# Patient Record
Sex: FEMALE | Race: WHITE | NOT HISPANIC OR LATINO | URBAN - METROPOLITAN AREA
[De-identification: names, ages, dates, MRNs, and addresses within clinical notes are randomized per-mention and may not be internally consistent; named-entity substitution may affect disease eponyms.]

---

## 2019-01-24 ENCOUNTER — NEW PATIENT (OUTPATIENT)
Dept: URBAN - METROPOLITAN AREA CLINIC 44 | Facility: CLINIC | Age: 69
End: 2019-01-24
Payer: MEDICARE

## 2019-01-24 DIAGNOSIS — H04.123 OPEN ANGLE WITH BORDERLINE FINDINGS, LOW RISK, BILATERAL: ICD-10-CM

## 2019-01-24 DIAGNOSIS — H00.14 CHALAZION LEFT UPPER LID: ICD-10-CM

## 2019-01-24 PROCEDURE — 92133 CPTRZD OPH DX IMG PST SGM ON: CPT | Performed by: OPTOMETRIST

## 2019-01-24 PROCEDURE — 76514 ECHO EXAM OF EYE THICKNESS: CPT | Performed by: OPTOMETRIST

## 2019-01-24 PROCEDURE — 92004 COMPRE OPH EXAM NEW PT 1/>: CPT | Performed by: OPTOMETRIST

## 2019-01-24 ASSESSMENT — INTRAOCULAR PRESSURE
OD: 21
OS: 21

## 2019-01-24 ASSESSMENT — KERATOMETRY
OS: 44.63
OD: 45.75

## 2019-01-24 ASSESSMENT — VISUAL ACUITY
OS: 20/20
OD: 20/20

## 2019-07-15 ENCOUNTER — FOLLOW UP ESTABLISHED (OUTPATIENT)
Dept: URBAN - METROPOLITAN AREA CLINIC 44 | Facility: CLINIC | Age: 69
End: 2019-07-15
Payer: MEDICARE

## 2019-07-15 PROCEDURE — 92083 EXTENDED VISUAL FIELD XM: CPT | Performed by: OPTOMETRIST

## 2019-07-15 PROCEDURE — 92014 COMPRE OPH EXAM EST PT 1/>: CPT | Performed by: OPTOMETRIST

## 2019-07-15 PROCEDURE — 92133 CPTRZD OPH DX IMG PST SGM ON: CPT | Performed by: OPTOMETRIST

## 2019-07-15 ASSESSMENT — INTRAOCULAR PRESSURE
OS: 21
OD: 21

## 2019-07-15 ASSESSMENT — KERATOMETRY
OS: 45.00
OD: 45.63

## 2020-01-16 ENCOUNTER — FOLLOW UP ESTABLISHED (OUTPATIENT)
Dept: URBAN - METROPOLITAN AREA CLINIC 44 | Facility: CLINIC | Age: 70
End: 2020-01-16
Payer: MEDICARE

## 2020-01-16 PROCEDURE — 92083 EXTENDED VISUAL FIELD XM: CPT | Performed by: OPTOMETRIST

## 2020-01-16 PROCEDURE — 92133 CPTRZD OPH DX IMG PST SGM ON: CPT | Performed by: OPTOMETRIST

## 2020-01-16 PROCEDURE — 92014 COMPRE OPH EXAM EST PT 1/>: CPT | Performed by: OPTOMETRIST

## 2020-01-16 ASSESSMENT — VISUAL ACUITY
OD: 20/25
OS: 20/20

## 2020-01-16 ASSESSMENT — INTRAOCULAR PRESSURE
OS: 21
OD: 21

## 2020-01-16 ASSESSMENT — KERATOMETRY
OS: 45.75
OD: 45.50

## 2021-06-01 ENCOUNTER — OFFICE VISIT (OUTPATIENT)
Dept: URBAN - METROPOLITAN AREA CLINIC 44 | Facility: CLINIC | Age: 71
End: 2021-06-01
Payer: MEDICARE

## 2021-06-01 PROCEDURE — 99214 OFFICE O/P EST MOD 30 MIN: CPT | Performed by: OPTOMETRIST

## 2021-06-01 PROCEDURE — 92083 EXTENDED VISUAL FIELD XM: CPT | Performed by: OPTOMETRIST

## 2021-06-01 PROCEDURE — 92133 CPTRZD OPH DX IMG PST SGM ON: CPT | Performed by: OPTOMETRIST

## 2021-06-01 PROCEDURE — 92134 CPTRZ OPH DX IMG PST SGM RTA: CPT | Performed by: OPTOMETRIST

## 2021-06-01 ASSESSMENT — VISUAL ACUITY
OS: 20/25
OD: 20/25

## 2021-06-01 ASSESSMENT — INTRAOCULAR PRESSURE
OS: 21
OD: 21

## 2021-06-01 ASSESSMENT — KERATOMETRY
OS: 45.13
OD: 45.38

## 2021-06-01 NOTE — IMPRESSION/PLAN
Impression: Open angle with borderline findings, low risk, bilateral: H40.013. Plan: OCT RNFL and Visual Fields wnl OU. IOP stable. Continue to monitor. 

RTC 6-8 months VF (24-2) complete exam oct RFNL

## 2021-06-01 NOTE — IMPRESSION/PLAN
Impression: Age-related nuclear cataract, bilateral: H25.13. Plan: Discussed cataracts with patient. Discussed treatment options. Surgical treatment is recommended. Surgical risks and benefits discussed. Patient elects surgical treatment. Recommend surgery OU, OS 1st . Patient is candidate/interested in multifocal, toric, standard, LenSx and ORA. Aim OD: [place] Aim OS: [place]. Patient will need glasses for full time wear.  LRI OU

## 2021-06-15 ENCOUNTER — OFFICE VISIT (OUTPATIENT)
Dept: URBAN - METROPOLITAN AREA CLINIC 44 | Facility: CLINIC | Age: 71
End: 2021-06-15
Payer: MEDICARE

## 2021-06-15 DIAGNOSIS — Z01.818 ENCOUNTER FOR OTHER PREPROCEDURAL EXAMINATION: Primary | ICD-10-CM

## 2021-06-15 DIAGNOSIS — H25.13 AGE-RELATED NUCLEAR CATARACT, BILATERAL: ICD-10-CM

## 2021-06-15 PROCEDURE — 92025 CPTRIZED CORNEAL TOPOGRAPHY: CPT | Performed by: OPHTHALMOLOGY

## 2021-06-15 PROCEDURE — 99203 OFFICE O/P NEW LOW 30 MIN: CPT | Performed by: PHYSICIAN ASSISTANT

## 2021-06-15 ASSESSMENT — PACHYMETRY
OD: 2.68
OS: 22.57
OD: 22.44
OS: 2.67

## 2021-06-21 ENCOUNTER — PRE-OPERATIVE VISIT (OUTPATIENT)
Dept: URBAN - METROPOLITAN AREA CLINIC 51 | Facility: CLINIC | Age: 71
End: 2021-06-21
Payer: MEDICARE

## 2021-06-21 DIAGNOSIS — H25.11 AGE-RELATED NUCLEAR CATARACT, RIGHT EYE: ICD-10-CM

## 2021-06-21 DIAGNOSIS — H40.013 OPEN ANGLE WITH BORDERLINE FINDINGS, LOW RISK, BILATERAL: ICD-10-CM

## 2021-06-21 DIAGNOSIS — H11.053 PERIPHERAL PTERYGIUM, STATIONARY, BILATERAL: ICD-10-CM

## 2021-06-21 DIAGNOSIS — H52.223 REGULAR ASTIGMATISM, BILATERAL: ICD-10-CM

## 2021-06-21 PROCEDURE — 92020 GONIOSCOPY: CPT | Performed by: OPHTHALMOLOGY

## 2021-06-21 PROCEDURE — 99204 OFFICE O/P NEW MOD 45 MIN: CPT | Performed by: OPHTHALMOLOGY

## 2021-06-21 NOTE — IMPRESSION/PLAN
Impression: Peripheral pterygium, stationary, bilateral: H11.053. Plan: Discussed with patient, understands this may limit vision after surgery.

## 2021-06-21 NOTE — IMPRESSION/PLAN
Impression: Age-related nuclear cataract, bilateral: H25.13. Plan: Discussed cataract diagnosis with the patient. Risks and benefits of surgical treatment were discussed and understood. Patient desires surgical treatment. Patient is a candidate for [employer], including torics discussed, but  pt declines, Pt does not mind wearing glasses after sx. Patient desires to proceed with surgery OU, OS FIRST. Both eyes examined, medically necessary due to impact in activities of daily living  (( AIM -0.25 OU: INJECTABLE OU (DEXYCU 1st choice), NO ORA OU  ,  LRI OD @ 80 ARC 80: LRI @174 ARC 80  ; glaucoma coverage, NO DIAMOX - sulfa allergy, DENSE, Cortical possible trypan; epi, poss stretch   ))  Patient understands the need for glasses after surgery. Understands higher risk of complication and delayed healing due to dense cataract. After a long discussion with pt, pt understands limitations of Toric IOL ( the 5% chance of needing a rotation of an IOL also understands NO PERFECT IOL , after long discussion , Patient declines Toric lens and understands will need glasses for both distance and near aim. Discussed level of vision and if wants to delay, but states having trouble with reading and desires surgery Discussed higher risks with smaller pupil and discussed iris stretch and higher risks of bleeding.

## 2021-06-21 NOTE — IMPRESSION/PLAN
Impression: Open angle with borderline findings, low risk, bilateral: H40.013.
 - Positive Family Hx of Glaucoma - Brother, Sister, and Mother - Has SVT , POSITIVE Sulfa Allergy -  Negative  Sleep Apnea - Hx of Migraines Plan: PLAN: Not on any glc meds - followed in general clinic ; test reviewed, IOP is stable    and so may proceed with cataract surgery with NO MIGS and Discussed glaucoma may limit vision after surgery. Discussed possible unmasking of scotoma after surgery. TESTS: Reviewed Discussed Glaucoma diagnosis in detail with patient. Emphasized and explain complaince. poor compliance can lead to Blindness.

## 2021-06-21 NOTE — IMPRESSION/PLAN
Impression: Age-related nuclear cataract, right eye: H25.11. Plan: PLAN: OK to proceed with second cataract surgery after first eye cleared.

## 2021-06-21 NOTE — IMPRESSION/PLAN
Impression: Dry eye syndrome of bilateral lacrimal glands Plan: artificial tears; Discussed with patient, understands this may limit vision after surgery.

## 2021-06-21 NOTE — IMPRESSION/PLAN
Impression: Regular astigmatism, bilateral: H52.223. Plan: Discussed with patient that LRI will attempt to reduce the astigmatism but will not get rid of all astigmatism and will need glasses after surgery. Discussed with patient, understands this may limit vision after surgery. Also discussed unmasking of astigmatism.

## 2021-07-01 ENCOUNTER — SURGERY (OUTPATIENT)
Dept: URBAN - METROPOLITAN AREA SURGERY 19 | Facility: SURGERY | Age: 71
End: 2021-07-01
Payer: MEDICARE

## 2021-07-01 PROCEDURE — 66984 XCAPSL CTRC RMVL W/O ECP: CPT | Performed by: OPHTHALMOLOGY

## 2021-07-02 ENCOUNTER — POST-OPERATIVE VISIT (OUTPATIENT)
Dept: URBAN - METROPOLITAN AREA CLINIC 44 | Facility: CLINIC | Age: 71
End: 2021-07-02

## 2021-07-02 PROCEDURE — 99024 POSTOP FOLLOW-UP VISIT: CPT | Performed by: OPTOMETRIST

## 2021-07-02 ASSESSMENT — INTRAOCULAR PRESSURE: OS: 22

## 2021-07-02 NOTE — IMPRESSION/PLAN
Impression: S/P Cataract Extraction by phacoemulsification with IOL placement; Astigmatic Keratotomy OS - 1 Day. Encounter for surgical aftercare following surgery on a sense organ  Z48.810.  Plan: brie meng iop ck next week iop same today as pre op

## 2021-07-08 ENCOUNTER — POST-OPERATIVE VISIT (OUTPATIENT)
Dept: URBAN - METROPOLITAN AREA CLINIC 44 | Facility: CLINIC | Age: 71
End: 2021-07-08
Payer: MEDICARE

## 2021-07-08 PROCEDURE — 99024 POSTOP FOLLOW-UP VISIT: CPT | Performed by: OPTOMETRIST

## 2021-07-08 ASSESSMENT — INTRAOCULAR PRESSURE
OS: 20
OD: 20

## 2021-07-08 ASSESSMENT — VISUAL ACUITY
OD: 20/25
OS: 20/25

## 2021-07-08 NOTE — IMPRESSION/PLAN
Impression: S/P Cataract Extraction by phacoemulsification with IOL placement; Astigmatic Keratotomy OS - 7 Days. Encounter for surgical aftercare following surgery on a sense organ  Z48.810.  Plan: early pch need yag futur e

## 2021-07-15 ENCOUNTER — SURGERY (OUTPATIENT)
Dept: URBAN - METROPOLITAN AREA SURGERY 19 | Facility: SURGERY | Age: 71
End: 2021-07-15
Payer: MEDICARE

## 2021-07-15 PROCEDURE — 66984 XCAPSL CTRC RMVL W/O ECP: CPT | Performed by: OPHTHALMOLOGY

## 2021-07-16 ENCOUNTER — POST-OPERATIVE VISIT (OUTPATIENT)
Dept: URBAN - METROPOLITAN AREA CLINIC 44 | Facility: CLINIC | Age: 71
End: 2021-07-16

## 2021-07-16 DIAGNOSIS — Z48.810 ENCOUNTER FOR SURGICAL AFTERCARE FOLLOWING SURGERY ON A SENSE ORGAN: ICD-10-CM

## 2021-07-16 PROCEDURE — 99024 POSTOP FOLLOW-UP VISIT: CPT | Performed by: OPTOMETRIST

## 2021-07-16 ASSESSMENT — INTRAOCULAR PRESSURE: OD: 23

## 2021-07-16 NOTE — IMPRESSION/PLAN
Impression: S/P Cataract Extraction by phacoemulsification with IOL placement; Astigmatic Keratotomy OD - 1 Day. Encounter for surgical aftercare following surgery on a sense organ  Z48.810.  Plan: reasuurance ok dc brim

## 2021-07-26 ENCOUNTER — POST-OPERATIVE VISIT (OUTPATIENT)
Dept: URBAN - METROPOLITAN AREA CLINIC 44 | Facility: CLINIC | Age: 71
End: 2021-07-26
Payer: MEDICARE

## 2021-07-26 DIAGNOSIS — Z96.1 PRESENCE OF INTRAOCULAR LENS: Primary | ICD-10-CM

## 2021-07-26 PROCEDURE — 99024 POSTOP FOLLOW-UP VISIT: CPT | Performed by: OPTOMETRIST

## 2021-07-26 ASSESSMENT — INTRAOCULAR PRESSURE
OS: 22
OD: 20

## 2021-07-26 NOTE — IMPRESSION/PLAN
Impression: S/P Cataract Extraction by phacoemulsification with IOL placement; Astigmatic Keratotomy OD - 11 Days. Presence of intraocular lens  Z96.1. Mild Iritis. Plan: Reviewed findings with patient. Continue with prescribed medications as directed.  RTC as scheduled for refraction/post-op

## 2021-08-05 ENCOUNTER — POST-OPERATIVE VISIT (OUTPATIENT)
Dept: URBAN - METROPOLITAN AREA CLINIC 44 | Facility: CLINIC | Age: 71
End: 2021-08-05
Payer: MEDICARE

## 2021-08-05 PROCEDURE — 99024 POSTOP FOLLOW-UP VISIT: CPT | Performed by: OPTOMETRIST

## 2021-08-05 RX ORDER — PREDNISOLONE ACETATE 10 MG/ML
1 % SUSPENSION/ DROPS OPHTHALMIC
Qty: 10 | Refills: 1 | Status: ACTIVE
Start: 2021-08-05

## 2021-08-05 ASSESSMENT — VISUAL ACUITY
OD: 20/25
OS: 20/20

## 2021-08-05 ASSESSMENT — INTRAOCULAR PRESSURE
OD: 14
OS: 14

## 2021-08-05 NOTE — IMPRESSION/PLAN
Impression: S/P Cataract Extraction by phacoemulsification with IOL placement; Astigmatic Keratotomy OD - 21 Days. Encounter for surgical aftercare following surgery on a sense organ  Z48.810. Plan: reassurance eye is white and quiet, no discharge, showed pt in mirror eye is white. All cc started after d/c pf gtts. Restart pf qam 2-3 weeks and d/c. AFT PRN, ok readers only per pt. Call if worse Return in 6 months for Visual Field (24-2), Complete  Exam, OCT(RNFL),

## 2022-02-10 ENCOUNTER — OFFICE VISIT (OUTPATIENT)
Dept: URBAN - METROPOLITAN AREA CLINIC 44 | Facility: CLINIC | Age: 72
End: 2022-02-10
Payer: MEDICARE

## 2022-02-10 PROCEDURE — 92014 COMPRE OPH EXAM EST PT 1/>: CPT | Performed by: OPTOMETRIST

## 2022-02-10 PROCEDURE — 92133 CPTRZD OPH DX IMG PST SGM ON: CPT | Performed by: OPTOMETRIST

## 2022-02-10 PROCEDURE — 92083 EXTENDED VISUAL FIELD XM: CPT | Performed by: OPTOMETRIST

## 2022-02-10 ASSESSMENT — KERATOMETRY
OS: 45.50
OD: 45.50

## 2022-02-10 ASSESSMENT — INTRAOCULAR PRESSURE
OS: 20
OD: 18

## 2022-02-10 ASSESSMENT — VISUAL ACUITY
OD: 20/20
OS: 20/20

## 2022-02-10 NOTE — IMPRESSION/PLAN
Impression: Open angle with borderline findings, low risk, bilateral: H40.013.
 - Positive Family Hx of Glaucoma - Brother, Sister, and Mother - Has SVT , POSITIVE Sulfa Allergy -  Negative  Sleep Apnea - Hx of Migraines Plan: Visual Fields wnl OU. OCT RNFL wnl OU. Thin Pachs +3mm; IOP wn.  Continue to monitor due to aniso C/D

## 2022-08-12 ENCOUNTER — OFFICE VISIT (OUTPATIENT)
Dept: URBAN - METROPOLITAN AREA CLINIC 44 | Facility: CLINIC | Age: 72
End: 2022-08-12
Payer: MEDICARE

## 2022-08-12 DIAGNOSIS — H40.013 OPEN ANGLE WITH BORDERLINE FINDINGS, LOW RISK, BILATERAL: Primary | ICD-10-CM

## 2022-08-12 DIAGNOSIS — H04.123 DRY EYE SYNDROME OF BILATERAL LACRIMAL GLANDS: ICD-10-CM

## 2022-08-12 PROCEDURE — 92083 EXTENDED VISUAL FIELD XM: CPT | Performed by: OPTOMETRIST

## 2022-08-12 PROCEDURE — 92134 CPTRZ OPH DX IMG PST SGM RTA: CPT | Performed by: OPTOMETRIST

## 2022-08-12 PROCEDURE — 92133 CPTRZD OPH DX IMG PST SGM ON: CPT | Performed by: OPTOMETRIST

## 2022-08-12 PROCEDURE — 99214 OFFICE O/P EST MOD 30 MIN: CPT | Performed by: OPTOMETRIST

## 2022-08-12 ASSESSMENT — INTRAOCULAR PRESSURE
OD: 20
OS: 22

## 2022-08-12 ASSESSMENT — KERATOMETRY
OD: 45.63
OS: 45.38

## 2022-08-12 NOTE — IMPRESSION/PLAN
Impression: Open angle with borderline findings, low risk, bilateral: H40.013.
 - Positive Family Hx of Glaucoma - Brother, Sister, and Mother - Has SVT , POSITIVE Sulfa Allergy -  Negative  Sleep Apnea - Hx of Migraines Plan: OCT RNFL and VF wnl OU. Thin Pachs +3mm; IOP slightly elevated today.  Continue to monitor due to +fhx and aniso C/D

## 2022-11-15 ENCOUNTER — OFFICE VISIT (OUTPATIENT)
Dept: URBAN - METROPOLITAN AREA CLINIC 44 | Facility: CLINIC | Age: 72
End: 2022-11-15
Payer: MEDICARE

## 2022-11-15 DIAGNOSIS — H40.013 OPEN ANGLE WITH BORDERLINE FINDINGS, LOW RISK, BILATERAL: Primary | ICD-10-CM

## 2022-11-15 DIAGNOSIS — H04.123 DRY EYE SYNDROME OF BILATERAL LACRIMAL GLANDS: ICD-10-CM

## 2022-11-15 PROCEDURE — 92014 COMPRE OPH EXAM EST PT 1/>: CPT | Performed by: OPTOMETRIST

## 2022-11-15 ASSESSMENT — INTRAOCULAR PRESSURE
OS: 20
OD: 20

## 2022-11-15 NOTE — IMPRESSION/PLAN
Impression: Open angle with borderline findings, low risk, bilateral: H40.013.
 - Positive Family Hx of Glaucoma - Brother, Sister, and Mother - Has SVT , POSITIVE Sulfa Allergy -  Negative  Sleep Apnea - Hx of Migraines Plan: OCT RNFL  Thin Pachs +3mm; IOP slightly elevated today.  Continue to monitor due to +fhx and aniso C/D

## 2022-11-15 NOTE — IMPRESSION/PLAN
Impression: Dry eye syndrome of bilateral lacrimal glands: H04.123. Plan: continue artificial tears PRN.